# Patient Record
(demographics unavailable — no encounter records)

---

## 2024-10-18 NOTE — HISTORY OF PRESENT ILLNESS
[FreeTextEntry1] : MUNIR LEWIS is a 62 year old male here for a physical exam. [de-identified] : His last physical exam was last year  Vaccines: Tetanus is up to date per patient Shingrix is up to date COVID vaccine is up to date  His last dentist visit was less than one year ago His last eye doctor appointment was less than one year ago His last dermatologist visit was less than one year ago, Dr. Gregory  Colon cancer screening is up to date, colonoscopy 6/2018, Dr. Suarez, repeat 10 years  His diet is healthy overall Exercise: cycling, weights, and cardio

## 2024-10-18 NOTE — HISTORY OF PRESENT ILLNESS
[FreeTextEntry1] : MUNIR LEWIS is a 62 year old male here for a physical exam. [de-identified] : His last physical exam was last year  Vaccines: Tetanus is up to date per patient Shingrix is up to date COVID vaccine is up to date  His last dentist visit was less than one year ago His last eye doctor appointment was less than one year ago His last dermatologist visit was less than one year ago, Dr. Gregory  Colon cancer screening is up to date, colonoscopy 6/2018, Dr. Suarez, repeat 10 years  His diet is healthy overall Exercise: cycling, weights, and cardio

## 2024-10-18 NOTE — HEALTH RISK ASSESSMENT
[0] : 2) Feeling down, depressed, or hopeless: Not at all (0) [PHQ-2 Negative - No further assessment needed] : PHQ-2 Negative - No further assessment needed [Never] : Never [[id number]] : 0

## 2024-10-18 NOTE — ASSESSMENT
[FreeTextEntry1] : MUNIR LEWIS is a 62 year old male here for a physical exam.  Patient has GERD, elevated LFTs, hypertension, hypercholesterolemia, and impaired fasting glucose. His has BPH managed by Urology (Dr. Chiu) who monitors his PSA.  He sees a cardiologist (Dr. Toussaint) regularly and does not need an EKG today. He had a CT angiogram which showed some blockages up to 50%. He was told he does not need a stent but to continue his statin.  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 His cholesterol was good but his fasting glucose was 108. He does not need blood work today.

## 2024-10-18 NOTE — HEALTH RISK ASSESSMENT
[0] : 2) Feeling down, depressed, or hopeless: Not at all (0) [PHQ-2 Negative - No further assessment needed] : PHQ-2 Negative - No further assessment needed [Never] : Never [[id number]] : 0

## 2024-10-18 NOTE — PLAN
[FreeTextEntry1] : Continue all medications as prescribed. Check labs as above. Will adjust any medications based upon lab results.  Reviewed age-appropriate preventive screening tests with patient. He will confirm the date of his last Tdap and update his history.  Discussed clean eating (eg Mediterranean style eating plan) and regular exercise/staying as physically active as possible.  Include balance exercises and strength training and core strengthening exercises for bone health and to decrease risk for falls.  Reviewed importance of good self care (e.g. meditation, yoga, adequate rest, regular exercise, magnesium, clean eating, etc.).  Follow up for next physical in one year.

## 2025-05-23 NOTE — IMAGING
[Right] : right elbow [de-identified] : The patient is a well appearing 62 year old male of their stated age. Neck is supple & nontender to palpation. Negative Spurling's test.   Effected Elbow: RIGHT  ROM: Flexion: 0-145 degrees Supination: 90 degrees Pronation: 90 degrees   Inspection: Erythema: None Ecchymosis: None Abrasions: None Effusion: None Deformity: None   Palpation: Crepitus: None Medial Epicondyle/Flexor-Pronator: Nontender Lateral Epicondyle/ECRB: TENDER  Olecranon: Nontender Radial Head: Nontender Humeral Head: Nontender Distal Biceps: Nontender Distal Triceps: Nontender Flexor-Pronator: Nontender Extensor/ECRB: Nontender UCL: Nontender Pronator Intersection: Nontender Ulnar Nerve:  Stable & Nontender   Stress Testing: Varus at 0 Degrees: Stable Varus at 30 Degrees: Stable Valgus at 0 Degrees: Stable Valgus at 30 Degrees: Stable   Motor: Elbow Flexion: 5 out of 5 Elbow Extension: 5 out of 5 Supination: 5 out of 5 Pronation: 5 out of 5 WITH PAIN  Wrist Flexion: 5 out of 5 Wrist Extension: 5 out of 5 Interossei: 5 out of 5 : 5 out of 5   Provocative Testing: Milking: Negative Moving Valgus Stress: Negative Posterolateral R-I: Negative Hook Test: Negative Resisted Pronation: WITH PAIN  Resisted Wrist Extension: WITH DISCOMFORT  Resisted Index Finger Extension: No Pain Resisted Middle Finger Extension: No Pain Resisted Wrist Flexion: No Pain Resisted Pronation: No Pain   Neurologic Exam: Axillary Nerve:  SLT Radial Nerve: SLT Median Nerve: SLT Ulnar Nerve:  SLT Other:  N/A Vascular Exam: Radial Pulse: 2+ Ulnar Pulse: 2+ Capillary Refill: <2 Seconds Other Exams: None Pertinent Contralateral Elbow Findings: None   Assessment: The patient is a 62 year old man with right elbow pain and radiographic and physical exam findings consistent with lateral epicondylitis.   The patients condition is acute Documents/Results Reviewed Today: X-Ray right elbow  Tests/Studies Independently Interpreted Today: X-Ray right elbow is benign, no obvious bony abnormalities, no loose bodies.  Pertinent findings include: 0-140 degrees, 90/90, tender lateral epicondyle, discomfort with resisted wrist extension, pain with resisted pronation.  Confounding medical conditions/concerns: None   Plan: The patient will begin physical therapy, HEP, and stretching for lateral epicondylitis. Recommended the patient obtain a Reparel elbow sleeve and topical Voltaren gel to aid in inflammation. The patient will begin use of counter force strap during activity and cock-up wrist splint at night to alleviate symptoms related to lateral epicondylitis - Rx provided today. Prescribed the patient Naproxen 500 mg BID for pain management and inflammation - use as directed and take with food. Reviewed all proper activity modifications to avoid aggravation of lateral epicondylitis to include limiting dumbbell work. If any pain persists despite conservative management, we will discuss proceeding with a PRP injection. Modify activity as discussed. Tests Ordered: None Prescription Medications Ordered: Naproxen 500mg Braces/DME Ordered: Reparel, counter force strap, cock-up wrist splint Activity/Work/Sports Status: As tolerated Additional Instructions: Topical Voltaren gel, limit dumbbells  Follow-Up: 3 weeks  The patient's current medication management of their orthopedic diagnosis was reviewed today: The patient was prescribed Naprosyn 500mg BID for two weeks and then as needed.  (1) We discussed a comprehensive treatment plan that included possible pharmaceutical management involving the use of prescription strength medications including but not limited to options such as oral Naprosyn 500mg BID, once daily Meloxicam 15 mg, or 500-650 mg Tylenol versus over the counter oral medications and topical prescription NSAID Pennsaid vs over the counter Voltaren gel.  Based on our extensive discussion, the patient was prescribed Naprosyn 500mg BID for two weeks.  It will then be used PRN for pain, inflammation and discomfort. (2) There is a moderate risk of morbidity with further treatment, especially from use of prescription strength medications and possible side effects of these medications which include upset stomach with oral medications, skin reactions to topical medications and cardiac/renal issues with long term use. (3) I recommended that the patient follow-up with their medical physician to discuss any significant specific potential issues with long term medication use such as interactions with current medications or with exacerbation of underlying medical comorbidities. (4) The benefits and risks associated with use of injectable, oral or topical, prescription and over the counter anti-inflammatory medications were discussed with the patient. The patient voiced understanding of the risks including but not limited to bleeding, stroke, kidney dysfunction, heart disease, and were referred to the black box warning label for further information.   [employer] attest that this documentation has been prepared under the direction and in the presence of Provider Dr. James Saavedra.   The documentation recorded by the scribe accurately reflects the services I, Dr. James Saavedra, personally performed and the decisions made by me. [FreeTextEntry1] :  X-Ray right elbow is benign, no obvious bony abnormalities, no loose bodies.

## 2025-05-23 NOTE — HISTORY OF PRESENT ILLNESS
[de-identified] : The patient is a 62 year  old RT hand dominant male who presents today complaining of RT elbow pain.   Date of Injury/Onset: 2 months Pain:    At Rest: 0/10  With Activity:  9/10  Mechanism of injury: onset of pain during DB flys at the gym- felt pop This is NOT a Work Related Injury being treated under Worker's Compensation. This is NOT an athletic injury occurring associated with an interscholastic or organized sports team. Quality of symptoms: sharp pain over olecranon Improves with: rest, activity modification, compression sleeve Worse with: terminal extension, AROM pronation Prior treatment: none Prior Imaging: none Out of work/sport: retired School/Sport/Position/Occupation: retired  Additional Information: None

## 2025-05-23 NOTE — IMAGING
[Right] : right elbow [de-identified] : The patient is a well appearing 62 year old male of their stated age. Neck is supple & nontender to palpation. Negative Spurling's test.   Effected Elbow: RIGHT  ROM: Flexion: 0-145 degrees Supination: 90 degrees Pronation: 90 degrees   Inspection: Erythema: None Ecchymosis: None Abrasions: None Effusion: None Deformity: None   Palpation: Crepitus: None Medial Epicondyle/Flexor-Pronator: Nontender Lateral Epicondyle/ECRB: TENDER  Olecranon: Nontender Radial Head: Nontender Humeral Head: Nontender Distal Biceps: Nontender Distal Triceps: Nontender Flexor-Pronator: Nontender Extensor/ECRB: Nontender UCL: Nontender Pronator Intersection: Nontender Ulnar Nerve:  Stable & Nontender   Stress Testing: Varus at 0 Degrees: Stable Varus at 30 Degrees: Stable Valgus at 0 Degrees: Stable Valgus at 30 Degrees: Stable   Motor: Elbow Flexion: 5 out of 5 Elbow Extension: 5 out of 5 Supination: 5 out of 5 Pronation: 5 out of 5 WITH PAIN  Wrist Flexion: 5 out of 5 Wrist Extension: 5 out of 5 Interossei: 5 out of 5 : 5 out of 5   Provocative Testing: Milking: Negative Moving Valgus Stress: Negative Posterolateral R-I: Negative Hook Test: Negative Resisted Pronation: WITH PAIN  Resisted Wrist Extension: WITH DISCOMFORT  Resisted Index Finger Extension: No Pain Resisted Middle Finger Extension: No Pain Resisted Wrist Flexion: No Pain Resisted Pronation: No Pain   Neurologic Exam: Axillary Nerve:  SLT Radial Nerve: SLT Median Nerve: SLT Ulnar Nerve:  SLT Other:  N/A Vascular Exam: Radial Pulse: 2+ Ulnar Pulse: 2+ Capillary Refill: <2 Seconds Other Exams: None Pertinent Contralateral Elbow Findings: None   Assessment: The patient is a 62 year old man with right elbow pain and radiographic and physical exam findings consistent with lateral epicondylitis.   The patients condition is acute Documents/Results Reviewed Today: X-Ray right elbow  Tests/Studies Independently Interpreted Today: X-Ray right elbow is benign, no obvious bony abnormalities, no loose bodies.  Pertinent findings include: 0-140 degrees, 90/90, tender lateral epicondyle, discomfort with resisted wrist extension, pain with resisted pronation.  Confounding medical conditions/concerns: None   Plan: The patient will begin physical therapy, HEP, and stretching for lateral epicondylitis. Recommended the patient obtain a Reparel elbow sleeve and topical Voltaren gel to aid in inflammation. The patient will begin use of counter force strap during activity and cock-up wrist splint at night to alleviate symptoms related to lateral epicondylitis - Rx provided today. Prescribed the patient Naproxen 500 mg BID for pain management and inflammation - use as directed and take with food. Reviewed all proper activity modifications to avoid aggravation of lateral epicondylitis to include limiting dumbbell work. If any pain persists despite conservative management, we will discuss proceeding with a PRP injection. Modify activity as discussed. Tests Ordered: None Prescription Medications Ordered: Naproxen 500mg Braces/DME Ordered: Reparel, counter force strap, cock-up wrist splint Activity/Work/Sports Status: As tolerated Additional Instructions: Topical Voltaren gel, limit dumbbells  Follow-Up: 3 weeks  The patient's current medication management of their orthopedic diagnosis was reviewed today: The patient was prescribed Naprosyn 500mg BID for two weeks and then as needed.  (1) We discussed a comprehensive treatment plan that included possible pharmaceutical management involving the use of prescription strength medications including but not limited to options such as oral Naprosyn 500mg BID, once daily Meloxicam 15 mg, or 500-650 mg Tylenol versus over the counter oral medications and topical prescription NSAID Pennsaid vs over the counter Voltaren gel.  Based on our extensive discussion, the patient was prescribed Naprosyn 500mg BID for two weeks.  It will then be used PRN for pain, inflammation and discomfort. (2) There is a moderate risk of morbidity with further treatment, especially from use of prescription strength medications and possible side effects of these medications which include upset stomach with oral medications, skin reactions to topical medications and cardiac/renal issues with long term use. (3) I recommended that the patient follow-up with their medical physician to discuss any significant specific potential issues with long term medication use such as interactions with current medications or with exacerbation of underlying medical comorbidities. (4) The benefits and risks associated with use of injectable, oral or topical, prescription and over the counter anti-inflammatory medications were discussed with the patient. The patient voiced understanding of the risks including but not limited to bleeding, stroke, kidney dysfunction, heart disease, and were referred to the black box warning label for further information.   [employer] attest that this documentation has been prepared under the direction and in the presence of Provider Dr. James Saavedra.   The documentation recorded by the scribe accurately reflects the services I, Dr. James Saavedra, personally performed and the decisions made by me. [FreeTextEntry1] :  X-Ray right elbow is benign, no obvious bony abnormalities, no loose bodies.

## 2025-05-23 NOTE — HISTORY OF PRESENT ILLNESS
[de-identified] : The patient is a 62 year  old RT hand dominant male who presents today complaining of RT elbow pain.   Date of Injury/Onset: 2 months Pain:    At Rest: 0/10  With Activity:  9/10  Mechanism of injury: onset of pain during DB flys at the gym- felt pop This is NOT a Work Related Injury being treated under Worker's Compensation. This is NOT an athletic injury occurring associated with an interscholastic or organized sports team. Quality of symptoms: sharp pain over olecranon Improves with: rest, activity modification, compression sleeve Worse with: terminal extension, AROM pronation Prior treatment: none Prior Imaging: none Out of work/sport: retired School/Sport/Position/Occupation: retired  Additional Information: None